# Patient Record
(demographics unavailable — no encounter records)

---

## 2025-05-02 NOTE — HISTORY OF PRESENT ILLNESS
[FreeTextEntry1] : CPE [de-identified] : JOANA ANGULO is a 81 year M who comes in for an annual physical exam. Patient with history of diabetes mellitus type 2, tremor, hypertension, GERD, hyponatremia, hyperkalemia, diabetic retinopathy. Patient suffered fall while in [place] on September 17 and had left hip fracture.  Patient had proximal femoral nail placed and suffered postop complications with possible pulmonary embolism and cardiac arrest with CPR for about 2 minutes.  Patient placed on anticoagulation postoperatively however developed anemia and had blood transfusions.  Patient also found to have hyponatremia worsening and was previously on sodium tablets but then discontinued. Patient has been back in [place] since April 10 and will be going back to [facility] in June 6.  He states overall he has been feeling well.  He did complete 3 months of physical therapy in [place] but continues to have to use a cane for support and ambulation. Patient did not see vascular surgery last year and notes continued swelling on and off of bilateral lower extremity. Patient denies any cp, sob, abdominal pain, nausea, vomiting, palpitations, fever, chills, constipation, diarrhea.

## 2025-05-02 NOTE — ASSESSMENT
[Vaccines Reviewed] : Immunizations reviewed today. Please see immunization details in the vaccine log within the immunization flowsheet.  [FreeTextEntry1] : 1.diabetic retinopathy: Status post Eylea injections.  Discussed following up with ophthalmology.  2.diabetes mellitus type 2: Recent hemoglobin A1c done last month well-controlled at 6.4, continue on glipizide 5 mg, Januvia 100 mg, metformin 1000 mg. Pt advised to keep diabetic diet, decrease carbs and increase dietary protein intake. Exercise as tolerated 3-4 times a week.  3.hypertension: Continue on diltiazem 120 mg once daily. Check blood pressure daily, if greater than 150/90 or less than 100/50, call MD. Keep 2 gm low sodium diet, exercise as tolerated.  4.lower extremity edema with chronic venous changes.  Discussed possibility of worsening PAD/PVD.  Given referral to vascular surgery last year and encouraged follow-up.  5.HM: Patient counseled regarding recommendations for vaccines, diet and exercise and all preventative screening.  Does not wish for colonoscopy, advised FIT stool testing.  Does not wish for further PSA testing at this time.  6.abnormal EKG: With no EKG changes with likely cardiac arrest while in [place] and September post-surgery with possible PE.  Advised cardiology referral at this time.  7.essential to 50 mg half a tablet daily.  Tremor: Continue on primidone  8.hyponatremia: No longer on sodium tablets, recheck BMP.  Recent level last month low at 131.9.

## 2025-05-02 NOTE — HEALTH RISK ASSESSMENT
[No] : In the past 12 months have you used drugs other than those required for medical reasons? No [0] : 2) Feeling down, depressed, or hopeless: Not at all (0) [Never] : Never [PHQ-2 Negative - No further assessment needed] : PHQ-2 Negative - No further assessment needed [I have developed a follow-up plan documented below in the note.] : I have developed a follow-up plan documented below in the note. [Yes] : Reviewed medication list for presence of high-risk medications. [Opioids] : opioids [Patient reported colonoscopy was normal] : Patient reported colonoscopy was normal [Audit-CScore] : 0 [IOB3Eicip] : 0 [EyeExamDate] : 2024 [ColonoscopyDate] : 5/18/2023

## 2025-05-02 NOTE — PHYSICAL EXAM
[TextEntry] : General: NAD HEENT: NC/AT, EOMI, PERRLA. Neck: supple, no JVD. no LAD. CVS: S1, S2 normal, RRR, no m/g/r Resp: CTA b/l, no wheeze/rale/rhonchi Abdomen: soft, NT/ND, positive bowel sounds.  Extremities: chronic venous changes of b/l LE, 1+ b/l LE edema Neuro: aaox3

## 2025-05-15 NOTE — PHYSICAL EXAM
[Well Developed] : well developed [Well Nourished] : well nourished [No Acute Distress] : no acute distress [Normal Conjunctiva] : normal conjunctiva [Normal Venous Pressure] : normal venous pressure [No Carotid Bruit] : no carotid bruit [Normal S1, S2] : normal S1, S2 [No Rub] : no rub [No Gallop] : no gallop [Clear Lung Fields] : clear lung fields [Good Air Entry] : good air entry [No Respiratory Distress] : no respiratory distress  [Soft] : abdomen soft [Non Tender] : non-tender [No Masses/organomegaly] : no masses/organomegaly [Normal Bowel Sounds] : normal bowel sounds [Normal Gait] : normal gait [No Rash] : no rash [No Skin Lesions] : no skin lesions [Moves all extremities] : moves all extremities [No Focal Deficits] : no focal deficits [Normal Speech] : normal speech [Alert and Oriented] : alert and oriented [Normal memory] : normal memory [de-identified] : systolic murmur  [de-identified] : trace pedal edema b/l

## 2025-05-15 NOTE — ASSESSMENT
[FreeTextEntry1] : HTN  BP mildly elevated today on diltiazem - cont to monitor   LE edema  with systolic murmur  will order echo to assess for valvular disease/systolic/diastolic dysfunction   Hx cardiac arrest unclear details of event but with risk factors for CAD  will order pharm nuclear stress   DM type II  controlled   in 4/2025   will f/u after above testing

## 2025-05-15 NOTE — HISTORY OF PRESENT ILLNESS
[FreeTextEntry1] : 81 yo male with a hx DM type II c/b retinopathy, tremor, hypertension, hyponatremia, hyperkalemia, GERD presenting to establish care.   Pt has no known cardiac hx - does report having recent hip fx repair with post op course c/b brief cardiac arrest.  Pt unable to elaborate further but reports no cardiac testing performed at the time.   He notes having LE edema and intermittent discoloration although no leg pain.  Denies chest pain, SOB, palpitations, dizziness.   PSHx: hip fx repair  Tobacco use: never, chews tobacco use  Alcohol use: none Drug use: none Family hx: no cardiac disease Social hx: lives with wife in [place] (lived here and retired in [place]) - and spends time with family/children when in [place] for several months, ambulates with cane (after hip sx)  Exercise: currently in PT

## 2025-06-04 NOTE — ASSESSMENT
[FreeTextEntry1] : 1.abnormal stress test: With medium sized defects of the anteroseptal and inferoseptal wall on stress test recently done, with history of cardiac arrest and left bundle branch block.  Discussed importance and need of cardiac cath/angioplasty with patient and son.  Discussed risks of not having this done in the near future and possibility of having this done in [facility] if not able to do so while in [place].  Patient understands and accepts all risks. All questions answered. Will reach out to cardiology office regarding update cardiac cath.

## 2025-06-06 NOTE — REVIEW OF SYSTEMS
[Negative] : Heme/Lymph [Fever] : no fever [Chills] : no chills [Feeling Poorly] : not feeling poorly [Feeling Tired] : not feeling tired [Chest Pain] : no chest pain [Palpitations] : no palpitations [Lower Ext Edema] : no extremity edema [Shortness Of Breath] : no shortness of breath [SOB on Exertion] : no shortness of breath during exertion

## 2025-06-06 NOTE — ASSESSMENT
[FreeTextEntry1] : Mr. ANGULO is an 82-year-old male referred by Dr. Tran who presents for consultation regarding multivessel coronary artery disease.   His past medical history includes hypertension, gout, diabetes, arthritis, tremor, electrolyte disturbance, and retinopathy.   Initially, he had hip fracture surgical repair which was complicated by cardiac arrest in [place]. [place] recently [place] the [place] approximately 2 [place] ago and had an annual physical with his PCP, Dr. Peralta. Exam had revealed a systolic murmur. He was referred to Cardiology (Dr. Vasquez). Given his history a stress test was performed as well as an echocardiogram. Stress test revealed medium mild to moderate sized defect in the anteroseptal and inferior as well as inferoseptal walls. He subsequently underwent cardiac catheterization which revealed multivessel coronary artery disease.  I have independently reviewed the medical records and imaging at the time of this office consultation, and discussed the following interpretations with Mr. ANGULO   Cardiac Catheterization reveals significant coronary artery disease. We discussed in great depth the diagnosis of coronary artery disease - the nature of the condition, including the narrowing or blockage of coronary arteries.   We discussed that ideal management of this would be Coronary Artery Bypass Grafting; we further discussed open surgical approach via a sternotomy. The planned procedures, hospital stays and recoveries were discussed in detail. All risks, benefits and alternatives were discussed at length with the patient.   Risks discussed include, but are not limited to infection, bleeding, myocardial infarction, cerebrovascular accident, renal failure, vascular injury requiring intervention and death. The patient fully understood and would like to proceed.  Testing: - Prior to coronary artery bypass grafting, a beta-blocker has been ordered (Metoprolol 12.5mg BID) - Vein mapping completed today in office 6/6/25 - Presurgical testing to be scheduled, which will include chest x-ray, electrocardiogram and standard labs - Testing to be completed prior to surgery includes: - CT Head non contrast - pulmonary function tests - carotid ultrasound  PREOPERATIVE CHECKLIST: (Discussed with patient) - Confirm allergies, including latex: NONE - Confirm pacemaker: NONE  - Anticoagulation/antiplatelets noted and will be discontinued/continued: Aspirin 81mg maintain - SGLT-2 Inhibitors (discontinued 3 days prior to surgery) or GLP-1 (discontinued 1 week prior to surgery): NONE - All other supplements, NSAIDs and fish oil were discussed and will be held one week before surgery  Surgical Plan: Coronary Artery Bypass Grafting x4 (LAD (artery), RCA (vein), OM1 (vein), OM2 (vein))   I, Dr. Leggett personally performed the evaluation and management (E/M) services for this new patient.  That E/M includes conducting the initial examination, assessing all conditions, and establishing the plan of care.  Today, my ACP, Janina Cummings NP was here to observe my evaluation and management services for this patient to be followed going forward.

## 2025-06-06 NOTE — PHYSICAL EXAM
[General Appearance - Alert] : alert [Sclera] : the sclera and conjunctiva were normal [Outer Ear] : the ears and nose were normal in appearance [Neck Appearance] : the appearance of the neck was normal [Respiration, Rhythm And Depth] : normal respiratory rhythm and effort [Auscultation Breath Sounds / Voice Sounds] : lungs were clear to auscultation bilaterally [Heart Rate And Rhythm] : heart rate was normal and rhythm regular [Heart Sounds] : normal S1 and S2 [Examination Of The Chest] : the chest was normal in appearance [Skin Color & Pigmentation] : normal skin color and pigmentation [Abnormal Walk] : normal gait [No Focal Deficits] : no focal deficits [Oriented To Time, Place, And Person] : oriented to person, place, and time [Impaired Insight] : insight and judgment were intact [Affect] : the affect was normal [Mood] : the mood was normal [Systolic grade ___/6] : A grade [unfilled]/6 systolic murmur was heard.

## 2025-06-06 NOTE — HISTORY OF PRESENT ILLNESS
[FreeTextEntry1] : Mr. ANGULO is an 82-year-old male referred by Dr. Tran who presents for consultation regarding multivessel coronary artery disease.   His past medical history includes hypertension, gout, diabetes, arthritis, tremor, electrolyte disturbance, and retinopathy.   Initially, he had hip fracture surgical repair which was complicated by cardiac arrest in [place]. [place] recently came [place] the [place] approximately 2 [place] ago and had an annual physical with his PCP, Dr. Swann. Exam had revealed a systolic murmur. He was referred to Cardiology (Dr. Vasquez). Given his history a stress test was performed as well as an echocardiogram. Stress test revealed medium mild to moderate sized defect in the anteroseptal and inferior as well as inferoseptal walls. He subsequently underwent cardiac catheterization which revealed multivessel coronary artery disease.  Today, he is accompanied by his two sons who assist in the history taking. He reports feeling overall well, with no complaints. Patient denies fatigue, chest pain, palpitations, dizziness/lightheadedness, syncope, orthopnea, lower extremity edema, shortness of breath, weight loss/weight gain.

## 2025-06-06 NOTE — DATA REVIEWED
[FreeTextEntry1] : Cardiac Catheterization from 06/05/25 at [facility] -Left main artery: Minor Irregularities -Left anterior descending: Diffuse heavily calcified 80% mid LAD -Circumflex: 90% stenosis in OM1. 90% stenosis in mid AV groove circumflex -Right Coronary Artery: Mid 90% lesion with diffuse 60% proximal disease   Diagnostic Conclusions:  -Normal LVEDP and LVEF -Severe triple vessel CAD -90% mid lesion in diffusely diseased RCA -90% mid OM1 lesion and 90% lesion in mid Circumflex -Diffuse 80% mid LAD (heavily calcified)  Transthoracic Echocardiogram from 05/30/25 Left Ventricle: The left ventricular cavity is normal in size. Left ventricular wall thickness is mildly increased. Abnormal (paradoxical) septal motion consistent with left bundle branch block. Left ventricular systolic function is normal with an ejection fraction visually estimated at 50 to 55%. The left ventricular diastolic function is indeterminate.  Right Ventricle: The right ventricular cavity is normal in size, with normal wall thickness and right ventricular systolic function is normal. Tricuspid annular plane systolic excursion (TAPSE) is 2.8 cm (normal >=1.7 cm). Tricuspid annular tissue Doppler S' is 14.0 cm/s (normal >10 cm/s).  Left Atrium: The left atrium is normal in size with an indexed volume of 21.90 ml/m.  Right Atrium: The right atrium is normal in size with an indexed volume of 13.24 ml/m.  Interatrial Septum: The interatrial septum appears intact.  Aortic Valve: The aortic valve appears trileaflet with normal systolic excursion. The peak transaortic velocity is 1.52 m/s and peak transaortic gradient is 9.2 mmHg. There is no evidence of aortic regurgitation.  Mitral Valve: Structurally normal mitral valve with normal leaflet excursion. There is normal leaflet mobility of the mitral valve. Thickened mitral valve leaflets. There is trace mitral regurgitation.  Tricuspid Valve: The tricuspid valve is structurally normal with normal leaflet excursion. There is mild tricuspid regurgitation. Estimated pulmonary artery systolic pressure is 31 mmHg.  Pulmonic Valve: Structurally normal pulmonic valve with normal leaflet excursion. There is trace pulmonic regurgitation.  Aorta: The aortic root at the sinuses of Valsalva is normal in size, measuring 3.00 cm (indexed 1.53 cm/m). The ascending aorta is normal in size, measuring 3.40 cm (indexed 1.73 cm/m).  Pericardium: No pericardial effusion seen.  Systemic Veins: The inferior vena cava is normal in size (normal <2.1cm) with normal inspiratory collapse (normal >50%) consistent with normal right atrial pressure ( R 3, range 0-5mmHg).    Nuclear Stress Testing 05/28/25 1. Myocardial Perfusion: Abnormal. 2. The ECG is uninterpretable for ischemia due to left bundle branch block. 3. Baseline electrocardiogram: normal sinus rhythm at a rate of 74 bpm with left bundle branch block. 4. Chest pain prior to test: no chest pain. Chest pain during pharmocologic test: no chest pain. 5. Electrocardiogram ischemic changes: Uninterpretable due to the LBBB. 6. Normal pharmocologic heart rate response. 7. Normal pharmocologic blood pressure response. 8. Qualitative Perfusion: - medium-sized, mild to moderate defect(s) in the anteroseptal and inferior and inferoseptal walls that are partially reversible suggestive of mild ischemia. 9. The left ventricle is normal in function and normal in size. 10. Arrhythmias: No arrhythmias noted. 11. Hypokinesis of the inferoseptal wall.

## 2025-06-19 NOTE — ASSESSMENT
[FreeTextEntry1] : Pt recovering well at home s/p OHS. Reviewed all medications and dosages with pt and pt family understanding. Pt has all medications in home and is taking as prescribed. Pain controlled with current medication regimen, pt using Tylenol. Pt son administering insulin to pt. at HS. Pt BG today 101, however up to 270 after breakfast which consisted of oatmeal. Rev'd appropriate carbohydrates and protein to stabilize BG. Pt has Free Style Esther monitor in place. Will continue to monitor and update NP if BG consisently above 200, Pt understands to hold insulin for BG <100. Pt will begin weighing himself tomorrow AM. VN to see pt later today, will refer to PT as pt using cane for ambulation. He does endorse ambulating often.   No further new symptoms, issues or concerns to report at this time. Pt eager to return to [facility], he understands he will need CTS and cardiac clearance first. Sutures removed and staples removed without incident.   PLAN:  -Continue current medication regimen   -Continue Post Operative Care including:      -Cleanse all incisions DAILY with mild soap and water. Avoid lotion, powders and/or creams near or on incisions       -Daily weights- report any increase of 2 lbs or more overnight to the [facility] or CTS team       -Incentive spirometry with cough and deep breathing several times per hour      -Ambulate as much as tolerated including outdoors if weather and safety permits      -Avoid lifting anything more than 5 lbs and avoid straining      -Maintain a low sodium, low fat, heart healthy diet, including healthy sources of protein   [facility] team will continue to follow up with pt status. NP/CCC roles explained with pt understanding, contact information provided. Pt agrees to call with any questions, issues or concerns.  Worsening symptoms reviewed with patient understanding.    FOLLOW UP APPOINTMENTS: CTS: 6/25 CARDIOLOGIST: Dr. Tran, pt son to schedule within 2 weeks PCP: Pt encouraged to follow up within one month of discharge, Dr. Peralta, pt son will schedule within 2 weeks as pt prefers PCP to manage DM or refer to his preferred ENDO

## 2025-06-19 NOTE — PHYSICAL EXAM
[Sclera] : the sclera and conjunctiva were normal [Neck Appearance] : the appearance of the neck was normal [Respiration, Rhythm And Depth] : normal respiratory rhythm and effort [Exaggerated Use Of Accessory Muscles For Inspiration] : no accessory muscle use [Apical Impulse] : the apical impulse was normal [Heart Rate And Rhythm] : heart rate was normal and rhythm regular [Murmurs] : no murmurs [Heart Sounds] : normal S1 and S2 [Chest Visual Inspection Thoracic Asymmetry] : no chest asymmetry [1+] : left 1+ [Abnormal Walk] : normal gait [Skin Color & Pigmentation] : normal skin color and pigmentation [Skin Turgor] : normal skin turgor [] : no rash [Oriented To Time, Place, And Person] : oriented to person, place, and time [Impaired Insight] : insight and judgment were intact [Affect] : the affect was normal [FreeTextEntry2] : B/L LE without edema, B/L calves soft, NT  [FreeTextEntry1] : at baseline

## 2025-06-19 NOTE — HISTORY OF PRESENT ILLNESS
[FreeTextEntry1] : 82M PMHx of CAD, HTN, HLD, OA, DM2, essential tremor, hyponatremia. In September 2024, in [place], s/p mechanical fall and underwent ORIF left hip proximal fibular, surgery was complicated by cardiac arrest. [place] the [place] a [place] ago, had an annual physical with his PCP, noted murmur, referred to cardiology. Cardiac workup and LHC revealed MVD. Pt was SDA now s/p CABG x4 on 6/13 with Dr. Leggett. Postop course remains uneventful thus far. Endo following for insulin adjustments, A1c 7.6 on multiple oral agents PTA. Pt remained hemodynamically stable and discharged home with support of spouse/family, home care services and the [facility] team. Initial visit completed in home CC "I'm ok, I'm tired, but better today"

## 2025-06-19 NOTE — REVIEW OF SYSTEMS
[Feeling Tired] : feeling tired [Negative] : Psychiatric [Fever] : no fever [Chills] : no chills [Feeling Poorly] : not feeling poorly [FreeTextEntry7] : last BM today

## 2025-06-19 NOTE — REASON FOR VISIT
[Follow-Up: _____] : a [unfilled] follow-up visit [Spouse] : spouse [Family Member] : family member [FreeTextEntry1] : [facility]- [facility]- [facility]

## 2025-06-23 NOTE — REASON FOR VISIT
[Spouse] : spouse [Family Member] : family member [de-identified] : coronary artery bypass grafting x4 (LIMA-LAD, SVG-OM1, SVG-OM2, SVG-RCA) [de-identified] : 06/13/25 [de-identified] : He presents to the office today with complaints of extreme fatigue. He was noted to be anemic during his hospitalization and received iron infusion during his stay. He is weak and feels like his legs are heavy and has caused him difficulty walking. He has been noting a drop in his blood glucose in the early morning hours around 2am on the Freestyle Esther that he wears. Post prandial glusose in the 200 range.

## 2025-06-23 NOTE — ASSESSMENT
[FreeTextEntry1] : This is an 82 year old male with past medical history of CAD, HTN, HLD, OA, DM2, essential tremor, hyponatremia. In September 2024, in [place], s/p mechanical fall and underwent ORIF left hip proximal fibular, surgery was complicated by cardiac arrest. [place] the [place] a [place] ago, had an annual physical with his PCP, noted murmur, referred to cardiology. Cardiac workup and LHC revealed MVD. Pt was SDA now s/p CABG x4 on 6/13 with Dr. Leggett. Postop course remains uneventful thus far. Endo following for insulin adjustments, A1c 7.6 on multiple oral agents PTA.  Today on exam patient's lungs clear bilaterally, sternum stable, incision clean, dry and intact. SVG site is clean, dry and intact. No peripheral edema noted. Instructed patient on importance of optimal glycemic control, daily showering, daily weights, incentive spirometer use, and increase ambulation as tolerated. Instructed to call office with any signs or symptoms of infection or weight gain of 2 or more pounds in 1 day or 3 or more pounds in 1 week.  Overall, I am pleased with his progress postoperatively. Given his fatigue and iron infusion status I have requested CBC and BMP. In addition to that given his hypoglycemic episodes in the evening I am discontinuing Lantus and placing him back on his pre operative regiment with glimepiride. I am recommending that he continue follow up care with Cardiology and Primary Care Provider; he will return to care in office as needed. All questions answered, patient verbalizes understanding.  PLAN: - CBC/BMP today - Discontinue Lantus resume glimepiride - Continue follow up care with Cardiology and Endocrinology  - Continue follow up care with Primary Care Provider - Return to care in the office as needed

## 2025-07-03 NOTE — ASSESSMENT
[FreeTextEntry1] : 1.fatigue: Discussed with patient and family members likely multifactorial from anemia plus fluctuating blood sugars plus medication side effect of hyponatremia. Discussed blood work to obtain. Per cardiology will be on lower dose metoprolol and different statin.  Patient has stopped Lasix 5 days ago.  2.CAD status post CABG: Will reduce metoprolol from 100 to 50 mg per cardiology, start on Crestor 20 mg once daily is discontinued Lipitor due to fatigue, patient stopped Lasix 5 days ago due to fatigue, recently seen by CT surgery and labs reviewed and notes reviewed.  3.diabetes mellitus type 2: Not well-controlled with significant hypo and hyperglycemic events.  Discussed hypoglycemic protocol and to discontinue glipizide at this time.  Will continue on metformin 1 g twice daily and Januvia 100 mg once daily.  Patient and son will message next week regarding patient's Premeal blood sugars 3 times daily to see if we can add GLP-1.  Advised to follow-up with endocrinology and referral given today. Pt advised to keep diabetic diet, decrease carbs and increase dietary protein intake. Exercise as tolerated 3-4 times a week.  4.hyponatremia: Patient self increased sodium chloride 1 g twice daily to 3 times daily, advise rechecking sodium today and will need to see nephrology as soon as possible, seen by Dr.Robert Genao in past.  Patient is also now off Lasix send discussed with patient and son that this can help reduce sodium levels.  Discussed signs and symptoms to warrant urgent evaluation.  5.anemia: Status post surgery, recheck CBC as well as iron profile.  Continue on iron 65 mg twice daily.

## 2025-07-03 NOTE — PHYSICAL EXAM
[Well Developed] : well developed [Well Nourished] : well nourished [No Acute Distress] : no acute distress [Normal Conjunctiva] : normal conjunctiva [Normal Venous Pressure] : normal venous pressure [No Carotid Bruit] : no carotid bruit [Normal S1, S2] : normal S1, S2 [No Rub] : no rub [No Gallop] : no gallop [Clear Lung Fields] : clear lung fields [Good Air Entry] : good air entry [No Respiratory Distress] : no respiratory distress  [Soft] : abdomen soft [Non Tender] : non-tender [No Masses/organomegaly] : no masses/organomegaly [Normal Bowel Sounds] : normal bowel sounds [Normal Gait] : normal gait [No Rash] : no rash [No Skin Lesions] : no skin lesions [Moves all extremities] : moves all extremities [No Focal Deficits] : no focal deficits [Normal Speech] : normal speech [Alert and Oriented] : alert and oriented [Normal memory] : normal memory [de-identified] : systolic murmur  [de-identified] : trace pedal edema b/l

## 2025-07-03 NOTE — PHYSICAL EXAM
[TextEntry] : General: NAD HEENT: NC/AT, EOMI, PERRLA. Neck: supple, no JVD. no LAD. CVS: S1, S2 normal, RRR, no m/g/r Chest: surgical scars healing well, abdominal scar with mild oozing of blood, no erythema Resp: CTA b/l, no wheeze/rale/rhonchi Abdomen: soft, NT/ND Extremities: no edema Neuro: aaox3

## 2025-07-03 NOTE — HISTORY OF PRESENT ILLNESS
[FreeTextEntry1] : 81 yo male with a hx CAD s/p 4V CABG (LIMA-LAD, SVG-OM1, SVG-OM2, SVG-RCA) on 6/13 with Dr. Leggett , DM type II c/b retinopathy, tremor, hypertension, hyponatremia, hyperkalemia, GERD presenting for a follow up post CABG,   Pt has no known cardiac hx - does report having recent hip fx repair with post op course c/b brief cardiac arrest.  Pt unable to elaborate further but reports no cardiac testing performed at the time.   He notes having LE edema and intermittent discoloration although no leg pain.  Denies chest pain, SOB, palpitations, dizziness.   He underwent a cath due to abnormal stress showing multivessel disease for which he was referred for CABG  now s/p 4V CABG (LIMA-LAD, SVG-OM1, SVG-OM2, SVG-RCA) on 6/13 with Dr. Leggett     PSHx: hip fx repair, CABG Tobacco use: never, chews tobacco use  Alcohol use: none Drug use: none Family hx: no cardiac disease Social hx: lives with wife in [place] (lived here and retired in [place]) - and spends time with family/children when in [place] for several months, ambulates with cane (after hip sx)  Exercise: currently in PT

## 2025-07-03 NOTE — ASSESSMENT
[FreeTextEntry1] : HTN   LE edema  with systolic murmur  will order echo to assess for valvular disease/systolic/diastolic dysfunction   Hx cardiac arrest unclear details of event but with risk factors for CAD  will order pharm nuclear stress   DM type II  controlled   in 4/2025   will f/u after above testing

## 2025-07-03 NOTE — HISTORY OF PRESENT ILLNESS
[Post-hospitalization from ___ Hospital] : Post-hospitalization from [unfilled] Hospital [Admitted on: ___] : The patient was admitted on [unfilled] [Discharged on ___] : discharged on [unfilled] [Discharge Summary] : discharge summary [Pertinent Labs] : pertinent labs [Radiology Findings] : radiology findings [Discharge Med List] : discharge medication list [FreeTextEntry2] : Mr. JONAA ANGULO is a 82 year M who comes in for a hospital follow up visit. Patient with history of hypertension, hyperlipidemia, diabetes mellitus type 2, gastroenteritis, essential tremor, hyponatremia, left hip fracture status post ORIF with complication of cardiac arrest. Patient recently found to have abnormal EKG and seen by cardiology and left heart cath showed multivessel disease and patient has now status post CABG x 4 on June 13th with .  While in the hospital patient was put on Lantus and per son and patient this did not control his blood sugars well and upon discharge patient was advised to take Lantus metformin and Januvia and did so for 4 days but his blood sugars were going up and down he stopped and restarted back on glipizide 5 mg twice daily.  Patient's last hemoglobin A1c was 7.6 on June 9. Since being home the patient has noted to be subsequently fatigued by his spouse and his son taking multiple frequent naps during the day and also sleeping 11 hours at night.  Patient's blood sugars have been very erratic ranging from  with low blood sugars happening overnight with elevated blood sugars happening midday.  Due to patient's significant fatigue he stopped taking his atorvastatin and Lasix about 4 days ago.  Patient also started on iron and increased his sodium chloride to 1 g 3 times a day instead of twice daily and notes he is feeling a little bit better. Patient did have blood work done by CT surgery on June 23 which we reviewed today do show sodium even lower at 127 with significant anemia with hemoglobin hematocrit at 10.3/32.4 with elevated WBC at 10.61.  Patient is now on iron 65 mg twice daily for the past 5 days as well. Patient did see cardiology earlier today and advised to start on Crestor instead of atorvastatin to see if this has less side effects of fatigue and also had metoprolol succinate ER reduced from 100 mg to 50 mg with to see if this helps with side effects of fatigue as well. Patient denies any cp, sob, abdominal pain, nausea, vomiting, palpitations, fever, chills, constipation, diarrhea.

## 2025-07-09 NOTE — PHYSICAL EXAM
[TextEntry] : General: NAD, No use of accessory muscles for breathing. Speaking in full sentences. Neuro: aaox3 psych: normal affect.

## 2025-07-09 NOTE — ASSESSMENT
[FreeTextEntry1] : 1.hyperkalemia: Recheck in ED is stable at 4.3 continue on current medication regimen.  2.hyponatremia: Patient will see nephrology at the end of the month and is continued on sodium chloride 1 g twice daily.  3.anemia: Likely iron deficiency.  Has never had a colonoscopy or any other gastroenterology workup.  Advise referral to gastroenterology at this time.  Will obtain FIT stool card testing.  4.elevated TSH: Will continue to monitor symptoms and recheck in 4 to 6 weeks.  5.fatigue: Will recheck TSH in a month, follow-up with cardiology regarding reducing metoprolol dose further assisted in fatigue.  6.diabetes mellitus type 2: Will start on Rybelsus 3 mg once daily for 30 days and then increase to 70 mg daily.  Advised patient and son to follow-up with insurance regarding coverage for Rybelsus versus Ozempic. Went over instructions and side effects of medication.

## 2025-07-09 NOTE — HISTORY OF PRESENT ILLNESS
[Home] : at home, [unfilled] , at the time of the visit. [Medical Office: ([facility])___] : at the medical office located in  [Telehealth (audio & video)] : This visit was provided via telehealth using real-time 2-way audio visual technology. [Verbal consent obtained from patient] : the patient, [unfilled] [FreeTextEntry1] :  Follow Up Visit  [de-identified] : JOANA ANGULO is a 82 year M who calls in for a follow up visit. Patient recently here last week and had blood work done at that time and called for critical labs with potassium at 6.6 without hemolysis and sent to the emergency room.  Patient went to the emergency room early on July 4 morning for repeat labs confirmed potassium normal at 4.3 sodium 132, hemoglobin still low at 10.1 with hematocrit 31.3, magnesium low at 1.4 BUN/creatinine stable and UA negative except for trace ketone and glucose with EKG showing left bundle branch block stable from prior EKG and chest x-ray showing mild cardiomegaly with left diaphragm ill-defined concerning for adjacent airspace disease.  Patient returned back home and advised to follow-up with PCP and cardiology. Per patient and son patient is feeling well with no new complaints and his fatigue is significantly improved since on lower dose metoprolol at 50 mg daily.  They are wondering if this caused side effects of nocturia as patient has had increased episodes of nocturia and changed his glipizide from evening to midday which has helped and nocturia down to 1-2 times in the evening.  Patient with 1 episode of chills and nocturia however since resolved and no pulmonary complaints.  No signs and symptoms of pneumonia. All recent labs done on July 3rd discussed with patient and son including elevated TSH, sodium level and continued anemia.  Patient denies any black or bloody stool currently but notes that he did have darker stool prior to cardiac surgery.  He is currently taking iron once daily. Off glipizide patient's blood sugars fasting are between 160 and 166, prelunch of between 180 and 190, presupper are between 140 and 200 and before bedtime between 180 and 200.  Discussed starting on GLP-1 and patient prefers oral medication at this time. Patient denies any cp, sob, abdominal pain, nausea, vomiting, palpitations, fever, chills, constipation, diarrhea.  [TextEntry] : Patient understands and agrees to the limitations of a telehealth visit and wishes to proceed with telehealth appointment. No physical examination could be performed as this is a telehealth visit.  Diagnosis and treatment plan is recommended based on symptoms provided by the patient.

## 2025-07-17 NOTE — HISTORY OF PRESENT ILLNESS
[FreeTextEntry1] : 81 yo male with a hx CAD s/p 4V CABG (LIMA-LAD, SVG-OM1, SVG-OM2, SVG-RCA) on 6/13 with Dr. Leggett , DM type II c/b retinopathy, tremor, hypertension, hyponatremia, hyperkalemia, GERD presenting for a follow up post CABG,   He underwent a cath due to abnormal stress showing multivessel disease for which he was referred for CABG  now s/p 4V CABG (LIMA-LAD, SVG-OM1, SVG-OM2, SVG-RCA) on 6/13 with Dr. Leggett    feeling slightly improved compared to prior visit   PSHx: hip fx repair, CABG Tobacco use: never, chews tobacco use  Alcohol use: none Drug use: none Family hx: no cardiac disease Social hx: lives with wife in [place] (lived here and retired in [place]) - and spends time with family/children when in [place] for several months, ambulates with cane (after hip sx)  Exercise: currently in PT

## 2025-07-17 NOTE — ASSESSMENT
[FreeTextEntry1] : CAD s/p  4V CABG (LIMA-LAD, SVG-OM1, SVG-OM2, SVG-RCA) on 6/13 with Dr. Leggett referred to cardiac rehab  cont aspirin, rosuvastatin 20mg    HTN  cont metoprolol succinate 50mg daily   DM type II  controlled   Dyslipidemia  in 4/2025   now on statin- will need repeat in ~3 mos   labs ordered   will f/u 3 mos - family notes he may be back in [place] then. dg f/u as needed

## 2025-07-17 NOTE — PHYSICAL EXAM
[Well Developed] : well developed [Well Nourished] : well nourished [No Acute Distress] : no acute distress [Normal Conjunctiva] : normal conjunctiva [Normal Venous Pressure] : normal venous pressure [No Carotid Bruit] : no carotid bruit [Normal S1, S2] : normal S1, S2 [No Rub] : no rub [No Gallop] : no gallop [Clear Lung Fields] : clear lung fields [Good Air Entry] : good air entry [No Respiratory Distress] : no respiratory distress  [Soft] : abdomen soft [Non Tender] : non-tender [No Masses/organomegaly] : no masses/organomegaly [Normal Bowel Sounds] : normal bowel sounds [Normal Gait] : normal gait [No Rash] : no rash [No Skin Lesions] : no skin lesions [Moves all extremities] : moves all extremities [No Focal Deficits] : no focal deficits [Normal Speech] : normal speech [Alert and Oriented] : alert and oriented [Normal memory] : normal memory [de-identified] : systolic murmur  [de-identified] : trace pedal edema b/l